# Patient Record
Sex: FEMALE | Race: WHITE | NOT HISPANIC OR LATINO | Employment: OTHER | URBAN - METROPOLITAN AREA
[De-identification: names, ages, dates, MRNs, and addresses within clinical notes are randomized per-mention and may not be internally consistent; named-entity substitution may affect disease eponyms.]

---

## 2017-03-01 ENCOUNTER — OFFICE VISIT (OUTPATIENT)
Dept: MEDICAL GROUP | Facility: MEDICAL CENTER | Age: 67
End: 2017-03-01
Payer: MEDICARE

## 2017-03-01 VITALS
TEMPERATURE: 99.1 F | HEART RATE: 78 BPM | OXYGEN SATURATION: 93 % | DIASTOLIC BLOOD PRESSURE: 66 MMHG | HEIGHT: 60 IN | BODY MASS INDEX: 44.88 KG/M2 | SYSTOLIC BLOOD PRESSURE: 128 MMHG | WEIGHT: 228.6 LBS

## 2017-03-01 DIAGNOSIS — E66.01 MORBID OBESITY WITH BMI OF 40.0-44.9, ADULT (HCC): ICD-10-CM

## 2017-03-01 DIAGNOSIS — E78.5 HYPERLIPIDEMIA, UNSPECIFIED HYPERLIPIDEMIA TYPE: ICD-10-CM

## 2017-03-01 DIAGNOSIS — Z23 NEED FOR VACCINATION: ICD-10-CM

## 2017-03-01 DIAGNOSIS — Z12.31 ENCOUNTER FOR SCREENING MAMMOGRAM FOR MALIGNANT NEOPLASM OF BREAST: ICD-10-CM

## 2017-03-01 DIAGNOSIS — E07.9 THYROID DISEASE: ICD-10-CM

## 2017-03-01 DIAGNOSIS — M17.2 BILATERAL POST-TRAUMATIC OSTEOARTHRITIS OF KNEE: ICD-10-CM

## 2017-03-01 DIAGNOSIS — Z86.79 H/O: HYPERTENSION: ICD-10-CM

## 2017-03-01 DIAGNOSIS — Z86.19 H/O HERPES GENITALIS: ICD-10-CM

## 2017-03-01 DIAGNOSIS — Z12.11 COLON CANCER SCREENING: ICD-10-CM

## 2017-03-01 DIAGNOSIS — H35.30 MACULAR DEGENERATION, LEFT EYE: ICD-10-CM

## 2017-03-01 PROCEDURE — 99204 OFFICE O/P NEW MOD 45 MIN: CPT | Performed by: FAMILY MEDICINE

## 2017-03-01 PROCEDURE — G8482 FLU IMMUNIZE ORDER/ADMIN: HCPCS | Performed by: FAMILY MEDICINE

## 2017-03-01 PROCEDURE — 3017F COLORECTAL CA SCREEN DOC REV: CPT | Mod: 1P | Performed by: FAMILY MEDICINE

## 2017-03-01 PROCEDURE — 4040F PNEUMOC VAC/ADMIN/RCVD: CPT | Mod: 8P | Performed by: FAMILY MEDICINE

## 2017-03-01 PROCEDURE — 3014F SCREEN MAMMO DOC REV: CPT | Performed by: FAMILY MEDICINE

## 2017-03-01 PROCEDURE — 1036F TOBACCO NON-USER: CPT | Performed by: FAMILY MEDICINE

## 2017-03-01 PROCEDURE — 1101F PT FALLS ASSESS-DOCD LE1/YR: CPT | Performed by: FAMILY MEDICINE

## 2017-03-01 PROCEDURE — G8419 CALC BMI OUT NRM PARAM NOF/U: HCPCS | Performed by: FAMILY MEDICINE

## 2017-03-01 PROCEDURE — G8432 DEP SCR NOT DOC, RNG: HCPCS | Performed by: FAMILY MEDICINE

## 2017-03-01 ASSESSMENT — PATIENT HEALTH QUESTIONNAIRE - PHQ9: CLINICAL INTERPRETATION OF PHQ2 SCORE: 0
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
## 2017-03-01 NOTE — MR AVS SNAPSHOT
Alyssa Sims   3/1/2017 8:40 AM   Office Visit   MRN: [id number]    Department:  [place] [address]   Dept Phone:  [phone]    Description:  Female : 1950   Provider:  Dylan Monroy M.D.           Reason for Visit     Establish Care request mammogram       Allergies as of 3/1/2017     Allergen Noted Reactions    Sulfa Drugs 2016         You were diagnosed with     Thyroid disease   [135996]       Morbid obesity with BMI of 40.0-44.9, adult (CMS-[facility])   [135255]       Hyperlipidemia, unspecified hyperlipidemia type   [4964761]       Bilateral post-traumatic osteoarthritis of knee   [7278096]       Macular degeneration, left eye   [795758]       H/O: hypertension   [938915]       H/O herpes genitalis   [137026]       Encounter for screening mammogram for malignant neoplasm of breast   [309299]       Colon cancer screening   [696000]       Need for vaccination   [671741]         Vital Signs     Blood Pressure Pulse Temperature Height Weight Body Mass Index    128/66 mmHg 78 37.3 °C (99.1 °F) 1.524 m (5') 103.692 kg (228 lb 9.6 oz) 44.65 kg/m2    Oxygen Saturation Smoking Status                93% Never Smoker           Basic Information     Date Of Birth Sex Race Ethnicity Preferred Language    1950 Female White Non- English      Problem List              ICD-10-CM Priority Class Noted - Resolved    Thyroid disease E07.9   3/1/2017 - Present    Morbid obesity with BMI of 40.0-44.9, adult ([facility]) E66.01, Z68.41   3/1/2017 - Present    Hyperlipidemia E78.5   3/1/2017 - Present    H/O: hypertension Z86.79   3/1/2017 - Present    Macular degeneration, left eye H35.30   3/1/2017 - Present    H/O herpes genitalis Z86.19   3/1/2017 - Present    Bilateral post-traumatic osteoarthritis of knee M17.2   3/1/2017 - Present      Health Maintenance        Date Due Completion Dates    IMM DTaP/Tdap/Td Vaccine (1 - Tdap) 1968 ---    PAP SMEAR 1970 ---    MAMMOGRAM 1989 ---    COLONOSCOPY 12/20/1999 ---    IMM ZOSTER VACCINE 12/20/2009 ---    BONE DENSITY 12/20/2014 ---    IMM PNEUMOCOCCAL 65+ (ADULT) LOW/MEDIUM RISK SERIES (1 of 2 - PCV13) 12/20/2014 ---            Current Immunizations     Influenza TIV (IM) 10/15/2016, 10/1/2015      Below and/or attached are the medications your provider expects you to take. Review all of your home medications and newly ordered medications with your provider and/or pharmacist. Follow medication instructions as directed by your provider and/or pharmacist. Please keep your medication list with you and share with your provider. Update the information when medications are discontinued, doses are changed, or new medications (including over-the-counter products) are added; and carry medication information at all times in the event of emergency situations     Allergies:  SULFA DRUGS - (reactions not documented)               Medications  Valid as of: March 01, 2017 -  9:13 AM    Generic Name Brand Name Tablet Size Instructions for use    Albuterol Sulfate (Aero Soln) albuterol 108 (90 BASE) MCG/ACT Inhale 2 Puffs by mouth every 6 hours as needed for Shortness of Breath.        Pneumococcal 13-Rosalina Conj Vacc (Suspension) PREVNAR 13  0.5 mL by Intramuscular route Once PRN for up to 1 dose.        Tetanus-Diphth-Acell Pertussis (Suspension) ADACEL 5-2-15.5 LF-MCG/0.5 0.5 mL by Intramuscular route Once PRN for up to 1 dose.        .                 Medicines prescribed today were sent to:     [facility] [address] [place] [address] - [address] [place] [address] AT [facility] & [address] [address]    Phone: [phone] Fax: [phone]    Open 24 Hours?: No      Medication refill instructions:       If your prescription bottle indicates you have medication refills left, it is not necessary to call your provider’s office. Please contact your pharmacy and they will refill your medication.    If your prescription bottle indicates you do not  have any refills left, you may request refills at any time through one of the following ways: The online [employer] system (except Urgent Care), by calling your provider’s office, or by asking your pharmacy to contact your provider’s office with a refill request. Medication refills are processed only during regular business hours and may not be available until the next business day. Your provider may request additional information or to have a follow-up visit with you prior to refilling your medication.   *Please Note: Medication refills are assigned a new Rx number when refilled electronically. Your pharmacy may indicate that no refills were authorized even though a new prescription for the same medication is available at the pharmacy. Please request the medicine by name with the pharmacy before contacting your provider for a refill.        Your To Do List     Future Labs/Procedures Complete By Expires    COMP METABOLIC PANEL  As directed 3/1/2018    LIPID PROFILE  As directed 3/1/2018    Comments:    Fasting at least 8hours    OCCULT BLOOD FECES IMMUNOASSAY  As directed 3/1/2018    TSH WITH REFLEX TO FT4  As directed 3/1/2018      Referral     A referral request has been sent to our patient care coordination department. Please allow 3-5 business days for us to process this request and contact you either by phone or mail. If you do not hear from us by the 5th business day, please call us at [phone].           [employer] Access Code: Activation code not generated  Current [employer] Status: Active

## 2017-03-01 NOTE — ASSESSMENT & PLAN NOTE
"Patient states she is seeing an ophthalmologist for macular degeneration of left eye. 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  "

## 2017-03-01 NOTE — ASSESSMENT & PLAN NOTE
"Patient states that she has thyroid disease, however is unclear if she has low thyroid or hyperthyroid. Given the patient was given thyroid supplementation past, she likely had hypothyroidism. However, she self discontinued the medication for this condition. Thereafter, she started taking a supplement \"thyroid health, \"which she takes on an every other day basis.    ROS is NEGATIVE for: cold or heat intolerance, anxiety/depression, chest pain/pressure, hair thickening/coarsening/falling out/thinning, skin changes, diarrhea/constipation.  "

## 2017-03-01 NOTE — ASSESSMENT & PLAN NOTE
Patient has been told she has hyperlipidemia, which was well-controlled on the past using a statin medication, however patient discontinue this medication. At present, patient will like to pursue vessel changes prior to restarting statin.    At some point her past, she also had a history of hypertension, for which she is currently not taking medication although does have controlled blood pressure. 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 Patient states she received an EKG as well as echocardiogram after diagnosis.    ROS is NEGATIVE for dizziness, generalized weakness/fatigue, vision/hearing changes, jaw pain/paresthesias, BUE pain/paresthesias/numbness/weakness, chest pain/pressure, palpitations, dyspnea, RUQ abdominal pain, oliguria/anuria, BLE edema.
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
## 2017-03-01 NOTE — ASSESSMENT & PLAN NOTE
Patient is a diagnosis of morbid obesity. Patient has been fairly consistent with her weight over the last few years. Patient states that she is interested in making dietary changes nor to lose weight.    ROS is NEGATIVE for blurred vision, polydipsia, polyuria, diaphoresis, palpitations, fatigue, irritability, flank pain, BLE paresthesias.    ROS is NEGATIVE for: cold or heat intolerance, anxiety/depression, chest pain/pressure, hair thickening/coarsening/falling out/thinning, skin changes, diarrhea/constipation.

## 2017-03-01 NOTE — Clinical Note
[facility]  Dylan Monroy M.D.  [address]  [place] [address]  Fax: [phone]   Authorization for Release/Disclosure of   Protected Health Information   Name: ALYSSA PICHARDO : 1950 SSN: [id number]   Address: [address] [place] [address] [address]  [place] [address] Phone:    [phone] (home)    I authorize the entity listed below to release/disclose the PHI below to:   [facility]/Dylan Monroy M.D. and Dylan Monroy M.D.   Provider or Entity Name:     Address   City, State, Zip   Phone:      Fax:     Reason for request: continuity of care   Information to be released:    [  ] LAST COLONOSCOPY,  including any PATH REPORT and follow-up  [  ] LAST FIT/COLOGUARD RESULT [  ] LAST DEXA  [  ] LAST MAMMOGRAM  [  ] LAST PAP  [  ] LAST LABS [  ] RETINA EXAM REPORT  [  ] IMMUNIZATION RECORDS  [  ] Release all info      [  ] Check here and initial the line next to each item to release ALL health information INCLUDING  _____ Care and treatment for drug and / or alcohol abuse  _____ HIV testing, infection status, or AIDS  _____ Genetic Testing    DATES OF SERVICE OR TIME PERIOD TO BE DISCLOSED: _____________  I understand and acknowledge that:  * This Authorization may be revoked at any time by you in writing, except if your health information has already been used or disclosed.  * Your health information that will be used or disclosed as a result of you signing this authorization could be re-disclosed by the recipient. If this occurs, your re-disclosed health information may no longer be protected by State or Federal laws.  * You may refuse to sign this Authorization. Your refusal will not affect your ability to obtain treatment.  * This Authorization becomes effective upon signing and will  on (date) __________.      If no date is indicated, this Authorization will  one (1) year from the signature date.    Name: Alyssa Pichardo    Signature:   Date:     3/1/2017       PLEASE  FAX REQUESTED RECORDS BACK TO: [phone]
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
## 2017-03-14 ENCOUNTER — HOSPITAL ENCOUNTER (OUTPATIENT)
Dept: LAB | Facility: MEDICAL CENTER | Age: 67
End: 2017-03-14
Attending: FAMILY MEDICINE
Payer: MEDICARE

## 2017-03-14 ENCOUNTER — HOSPITAL ENCOUNTER (OUTPATIENT)
Facility: MEDICAL CENTER | Age: 67
End: 2017-03-14
Attending: FAMILY MEDICINE
Payer: MEDICARE

## 2017-03-14 DIAGNOSIS — E66.01 MORBID OBESITY WITH BMI OF 40.0-44.9, ADULT (HCC): ICD-10-CM

## 2017-03-14 DIAGNOSIS — Z86.79 H/O: HYPERTENSION: ICD-10-CM

## 2017-03-14 DIAGNOSIS — E07.9 THYROID DISEASE: ICD-10-CM

## 2017-03-14 DIAGNOSIS — E78.5 HYPERLIPIDEMIA, UNSPECIFIED HYPERLIPIDEMIA TYPE: ICD-10-CM

## 2017-03-14 LAB
ALBUMIN SERPL BCP-MCNC: 3.9 G/DL (ref 3.2–4.9)
ALBUMIN/GLOB SERPL: 1.1 G/DL
ALP SERPL-CCNC: 75 U/L (ref 30–99)
ALT SERPL-CCNC: 41 U/L (ref 2–50)
ANION GAP SERPL CALC-SCNC: 8 MMOL/L (ref 0–11.9)
AST SERPL-CCNC: 31 U/L (ref 12–45)
BILIRUB SERPL-MCNC: 1.1 MG/DL (ref 0.1–1.5)
BUN SERPL-MCNC: 21 MG/DL (ref 8–22)
CALCIUM SERPL-MCNC: 9.6 MG/DL (ref 8.4–10.2)
CHLORIDE SERPL-SCNC: 106 MMOL/L (ref 96–112)
CHOLEST SERPL-MCNC: 280 MG/DL (ref 100–199)
CO2 SERPL-SCNC: 22 MMOL/L (ref 20–33)
CREAT SERPL-MCNC: 1.34 MG/DL (ref 0.5–1.4)
GFR SERPL CREATININE-BSD FRML MDRD: 39 ML/MIN/1.73 M 2
GLOBULIN SER CALC-MCNC: 3.4 G/DL (ref 1.9–3.5)
GLUCOSE SERPL-MCNC: 133 MG/DL (ref 65–99)
HDLC SERPL-MCNC: 41 MG/DL
LDLC SERPL CALC-MCNC: 199 MG/DL
POTASSIUM SERPL-SCNC: 4.1 MMOL/L (ref 3.6–5.5)
PROT SERPL-MCNC: 7.3 G/DL (ref 6–8.2)
SODIUM SERPL-SCNC: 136 MMOL/L (ref 135–145)
TRIGL SERPL-MCNC: 201 MG/DL (ref 0–149)
TSH SERPL DL<=0.005 MIU/L-ACNC: 4.97 UIU/ML (ref 0.35–5.5)

## 2017-03-14 PROCEDURE — 36415 COLL VENOUS BLD VENIPUNCTURE: CPT

## 2017-03-14 PROCEDURE — 84443 ASSAY THYROID STIM HORMONE: CPT

## 2017-03-14 PROCEDURE — 80061 LIPID PANEL: CPT

## 2017-03-14 PROCEDURE — 80053 COMPREHEN METABOLIC PANEL: CPT

## 2017-03-15 PROBLEM — N18.30 CHRONIC KIDNEY DISEASE, STAGE III (MODERATE): Status: ACTIVE | Noted: 2017-03-15

## 2017-03-17 DIAGNOSIS — Z12.11 COLON CANCER SCREENING: ICD-10-CM

## 2017-03-17 LAB — HEMOCCULT STL QL IA: NEGATIVE

## 2017-03-27 ENCOUNTER — NON-PROVIDER VISIT (OUTPATIENT)
Dept: MEDICAL GROUP | Facility: MEDICAL CENTER | Age: 67
End: 2017-03-27
Payer: MEDICARE

## 2017-03-27 VITALS — WEIGHT: 225.31 LBS | HEIGHT: 60 IN | BODY MASS INDEX: 44.23 KG/M2

## 2017-03-27 PROCEDURE — G0447 BEHAVIOR COUNSEL OBESITY 15M: HCPCS | Performed by: PHYSICIAN ASSISTANT

## 2017-03-27 NOTE — MR AVS SNAPSHOT
Alyssa Sims   3/27/2017 1:20 PM   Non-Provider Visit   MRN: [id number]    Department:  [place] [address]   Dept Phone:  [phone]    Description:  Female : 1950   Provider:  Sigrid Tran RD           Reason for Visit     Obesity           Allergies as of 3/27/2017     Allergen Noted Reactions    Sulfa Drugs 2016         You were diagnosed with     Body mass index 40.0-44.9, adult (CMS-HCC)   [V85.41.ICD-9-CM]         Vital Signs     Height Weight Body Mass Index Smoking Status          1.524 m (5') 102.2 kg (225 lb 5 oz) 44.00 kg/m2 Never Smoker         Basic Information     Date Of Birth Sex Race Ethnicity Preferred Language    1950 Female White Non- English      Your appointments     Mar 28, 2017  3:00 PM   MA SCRN10 with [facility] MG 3   [facility] ([address])    [address]  [place] [address]   [phone]           No deodorant, powder, perfume or lotion under the arm or breast area.            2017 11:20 AM   Established Patient with Dylan Monroy M.D.   [facility] ([place])    [address]  [place] [address] [phone]           You will be receiving a confirmation call a few days before your appointment from our automated call confirmation system.            2017  1:00 PM   Non Provider 1 with Sigrid Tran RD   [facility] ([place])    [address]  [address]  [place] [address] [phone]           You will be receiving a confirmation call a few days before your appointment from our automated call confirmation system.            Apr 10, 2017  1:00 PM   Non Provider 1 with Sigrid Tran RD   [facility] ([place])    [address]  [address]  [place] [address] [phone]           You will be receiving a confirmation call a few days before your appointment from  our automated call confirmation system.              Problem List              ICD-10-CM Priority Class Noted - Resolved    Thyroid disease E07.9   3/1/2017 - Present    Morbid obesity with BMI of 40.0-44.9, adult (HCC) E66.01, Z68.41   3/1/2017 - Present    Hyperlipidemia E78.5   3/1/2017 - Present    H/O: hypertension Z86.79   3/1/2017 - Present    Macular degeneration, left eye H35.30   3/1/2017 - Present    H/O herpes genitalis Z86.19   3/1/2017 - Present    Bilateral post-traumatic osteoarthritis of knee M17.2   3/1/2017 - Present    Chronic kidney disease, stage III (moderate) N18.3   3/15/2017 - Present      Health Maintenance        Date Due Completion Dates    IMM DTaP/Tdap/Td Vaccine (1 - Tdap) 12/20/1968 ---    PAP SMEAR 12/20/1970 ---    MAMMOGRAM 12/20/1989 ---    COLONOSCOPY 12/20/1999 ---    IMM ZOSTER VACCINE 12/20/2009 ---    BONE DENSITY 12/20/2014 ---    IMM PNEUMOCOCCAL 65+ (ADULT) LOW/MEDIUM RISK SERIES (1 of 2 - PCV13) 12/20/2014 ---            Current Immunizations     Influenza TIV (IM) 10/15/2016, 10/1/2015      Below and/or attached are the medications your provider expects you to take. Review all of your home medications and newly ordered medications with your provider and/or pharmacist. Follow medication instructions as directed by your provider and/or pharmacist. Please keep your medication list with you and share with your provider. Update the information when medications are discontinued, doses are changed, or new medications (including over-the-counter products) are added; and carry medication information at all times in the event of emergency situations     Allergies:  SULFA DRUGS - (reactions not documented)               Medications  Valid as of: March 27, 2017 -  2:19 PM    Generic Name Brand Name Tablet Size Instructions for use    Albuterol Sulfate (Aero Soln) albuterol 108 (90 BASE) MCG/ACT Inhale 2 Puffs by mouth every 6 hours as needed for Shortness of Breath.         Pneumococcal 13-Rosalina Conj Vacc (Suspension) PREVNAR 13  0.5 mL by Intramuscular route Once PRN for up to 1 dose.        Tetanus-Diphth-Acell Pertussis (Suspension) ADACEL 5-2-15.5 LF-MCG/0.5 0.5 mL by Intramuscular route Once PRN for up to 1 dose.        .                 Medicines prescribed today were sent to:     [facility] [address] [place] [address] - [facility] [facility] AT [facility] & [facility]    [address] [facility] [place] [address]    Phone: [phone] Fax: [phone]    Open 24 Hours?: No      Medication refill instructions:       If your prescription bottle indicates you have medication refills left, it is not necessary to call your provider’s office. Please contact your pharmacy and they will refill your medication.    If your prescription bottle indicates you do not have any refills left, you may request refills at any time through one of the following ways: The online [employer] system (except Urgent Care), by calling your provider’s office, or by asking your pharmacy to contact your provider’s office with a refill request. Medication refills are processed only during regular business hours and may not be available until the next business day. Your provider may request additional information or to have a follow-up visit with you prior to refilling your medication.   *Please Note: Medication refills are assigned a new Rx number when refilled electronically. Your pharmacy may indicate that no refills were authorized even though a new prescription for the same medication is available at the pharmacy. Please request the medicine by name with the pharmacy before contacting your provider for a refill.           [employer] Access Code: Activation code not generated  Current [employer] Status: Active

## 2017-03-27 NOTE — PROGRESS NOTES
IBT INITIAL ASSESMENT    Author: Sigrid Tran Date & Time created: 3/27/2017  1:52 PM   Visit #: 1  Referring Provider: No ref. provider found  Patient Age: 66 y.o.  Time in/Out:  1:20-1:54pm     ASSESS:  Filed Vitals:    03/27/17 1351   Height: 1.524 m (5')   Weight: 102.2 kg (225 lb 5 oz)      Filed Vitals:    03/27/17 1351   Height: 1.524 m (5')   Weight: 102.2 kg (225 lb 5 oz)    Body mass index is 44 kg/(m^2).   Goal Weight:   NA     The patient states  Health, heart health and cholesterol as motivators for weight loss and has tried many diets include some very low calorie diets such as 800kcal or 500kcal diets for weight loss in the past with out lasting success.    Comorbidities include  Thyroid disease, HLD, HTN, CKD stage III, s/p hysterectomy      Current Dietary/Exercise Habits  1.  How many servings of fruits and vegetables do you eat in a typical day?   1 fruit or less per day; has been eating more veggies   2. How many servings of whole grains do you eat in a typical day?   Few   3. How many times in a typical week do you eat foods high in fat or eat at a fast food restaurant?   1 per month or less due to finances   4. How many times in a typical week do you eat red meat, pork, or processed meat?   3 times per month . Some deli meats -- turkey or chx   5. How many days a week do you participate in moderately intense physical activity for 30 minutes?   None. Pt has hx of post viral syndrome and cannot tolerate much physical exertion. She is also moving soon so is more active lately doing that.   6. How many days a week do you participate in vigorously intense physical activity for 20 minutes?   None   7. How many days a week do you do strength training exercise?  None   8. How many meals and snacks do you eat in a typical day? 2 meals. No snacks   9. Nutrition History/other: Pt used to be underweight as a child and young adult. After having kids gained weight and then got sick and put on a lot of weight.  Has a hx of viral illness that has reduced her activity level and was encouraged to eat a lot by her doctors to gain weight and while taking certain medications. She now has stopped worrying about weight for many years but recently has wanted to try to lose weight again. Looked at the vegan book Dr. Monroy recommended. But does not think a 100% vegan diet would work for her long term.     Estimated Stage of Change   Contemplation as evidenced by not yet able to plan goals for dietary changes.    ADVISE:    Physical Activity:   Encouraged pt to reduce her sedentary time by getting up and walking around every 90mins. Also encouraged her to walk around her neighborhood to add to daily activity. However only light exercise as tolerated. 2 days a week. Especially with the move . Pt knows her own limits and should not over exert herself.      Dietary Guidelines:   Encouraged pt to keep a food journal in order to assess areas of her diet and she feels she could improve upon. Explained goals of this program. Helped pt to set initial goals.  Recommended she try eating more frequent that just 2 times a day.     The patient has been advised of how weight management and physical activity impacts their health and will help to reduce complications and health risk factors.    AGREE:  Goals:   1. Keep a 3-5 day food diary of all foods/drinks consumed, the amount you consumed (approximately), and the time it was when consumed.   2. Eat 3 meals a day   3. Walk neighborhood 2 days this week     ASSIST:  Pt has a complicated pmhx. She has been underweight with rapid weight gain and then rapid weight loss on very low calorie diets. I anticipate her metabolic rate has slowed with age, inactivity, thyroid disease and extreme dieting. Will encouraged her to eat small frequent meals, increase activity as tolerated to start. A plant based diet would be ideal for her given her cardiac hx and not taking statins despite high cholesterol. However  will work with her on how to apply a plant based diet in to her lifestyle. Pt cannot cook on stove tops due to poor vision. She can prepare meals but has to be cautious. This will also be taken in to consideration. She will bring in the name of the book Cher Monroy recommended as well so I can help her with understanding that.     ARRANGE:     Return for follow-up in  1 week    The patient was assisted in making follow-up appointment per orders.

## 2017-03-28 ENCOUNTER — HOSPITAL ENCOUNTER (OUTPATIENT)
Dept: RADIOLOGY | Facility: MEDICAL CENTER | Age: 67
End: 2017-03-28
Attending: FAMILY MEDICINE
Payer: MEDICARE

## 2017-03-28 PROCEDURE — 77063 BREAST TOMOSYNTHESIS BI: CPT

## 2017-04-03 ENCOUNTER — OFFICE VISIT (OUTPATIENT)
Dept: MEDICAL GROUP | Facility: MEDICAL CENTER | Age: 67
End: 2017-04-03
Payer: MEDICARE

## 2017-04-03 ENCOUNTER — NON-PROVIDER VISIT (OUTPATIENT)
Dept: MEDICAL GROUP | Facility: MEDICAL CENTER | Age: 67
End: 2017-04-03
Payer: MEDICARE

## 2017-04-03 VITALS
TEMPERATURE: 98.1 F | DIASTOLIC BLOOD PRESSURE: 82 MMHG | HEIGHT: 60 IN | BODY MASS INDEX: 44.25 KG/M2 | OXYGEN SATURATION: 95 % | HEART RATE: 87 BPM | SYSTOLIC BLOOD PRESSURE: 140 MMHG | WEIGHT: 225.4 LBS

## 2017-04-03 DIAGNOSIS — E66.01 MORBID OBESITY WITH BMI OF 40.0-44.9, ADULT (HCC): ICD-10-CM

## 2017-04-03 DIAGNOSIS — E78.5 HYPERLIPIDEMIA, UNSPECIFIED HYPERLIPIDEMIA TYPE: ICD-10-CM

## 2017-04-03 DIAGNOSIS — Z90.710 S/P HYSTERECTOMY WITH OOPHORECTOMY: ICD-10-CM

## 2017-04-03 DIAGNOSIS — Z90.721 S/P HYSTERECTOMY WITH OOPHORECTOMY: ICD-10-CM

## 2017-04-03 DIAGNOSIS — R92.8 ABNORMAL MAMMOGRAM OF LEFT BREAST: ICD-10-CM

## 2017-04-03 DIAGNOSIS — Z23 NEED FOR VACCINATION: ICD-10-CM

## 2017-04-03 DIAGNOSIS — R73.01 ELEVATED FASTING BLOOD SUGAR: ICD-10-CM

## 2017-04-03 PROCEDURE — G0447 BEHAVIOR COUNSEL OBESITY 15M: HCPCS | Performed by: FAMILY MEDICINE

## 2017-04-03 PROCEDURE — 99215 OFFICE O/P EST HI 40 MIN: CPT | Mod: 25 | Performed by: FAMILY MEDICINE

## 2017-04-03 PROCEDURE — 90715 TDAP VACCINE 7 YRS/> IM: CPT | Performed by: FAMILY MEDICINE

## 2017-04-03 PROCEDURE — 90471 IMMUNIZATION ADMIN: CPT | Performed by: FAMILY MEDICINE

## 2017-04-03 NOTE — MR AVS SNAPSHOT
Alyssa Sims   4/3/2017 1:00 PM   Non-Provider Visit   MRN: [id number]    Department:  [place] [address]   Dept Phone:  [phone]    Description:  Female : 1950   Provider:  Sigrid Tran RD           Reason for Visit     Obesity           Allergies as of 4/3/2017     Allergen Noted Reactions    Sulfa Drugs 2016         Vital Signs     Smoking Status                   Never Smoker            Basic Information     Date Of Birth Sex Race Ethnicity Preferred Language    1950 Female White Non- English      Your appointments     Apr 10, 2017  1:40 PM   Non Provider 1 with Sigrid Tran RD   [facility] ([place])    [address]  [address]  [place] [address] [phone]           You will be receiving a confirmation call a few days before your appointment from our automated call confirmation system.            2017 11:00 AM   Established Patient with Dylan Monroy M.D.   [facility] ([place])    [address]  [address]  [place] [id number] [phone]           You will be receiving a confirmation call a few days before your appointment from our automated call confirmation system.              Problem List              ICD-10-CM Priority Class Noted - Resolved    Thyroid disease E07.9   3/1/2017 - Present    Morbid obesity with BMI of 40.0-44.9, adult (HCC) E66.01, Z68.41   3/1/2017 - Present    Hyperlipidemia E78.5   3/1/2017 - Present    H/O: hypertension Z86.79   3/1/2017 - Present    Macular degeneration, left eye H35.30   3/1/2017 - Present    H/O herpes genitalis Z86.19   3/1/2017 - Present    Bilateral post-traumatic osteoarthritis of knee M17.2   3/1/2017 - Present    Chronic kidney disease, stage III (moderate) N18.3   3/15/2017 - Present    Elevated fasting blood sugar R73.01   4/3/2017 - Present    S/P hysterectomy with oophorectomy Z90.710, Z90.721   4/3/2017 - Present       Health Maintenance        Date Due Completion Dates    IMM DTaP/Tdap/Td Vaccine (1 - Tdap) 12/20/1968 ---    MAMMOGRAM 12/20/1989 ---    IMM ZOSTER VACCINE 12/20/2009 ---    BONE DENSITY 12/20/2014 ---    IMM PNEUMOCOCCAL 65+ (ADULT) LOW/MEDIUM RISK SERIES (1 of 2 - PCV13) 12/20/2014 ---    COLON CANCER SCREENING ANNUAL FIT 3/14/2018 3/14/2017            Current Immunizations     13-VALENT PCV PREVNAR  Incomplete    Influenza TIV (IM) 10/15/2016, 10/1/2015    Tdap Vaccine 4/3/2017      Below and/or attached are the medications your provider expects you to take. Review all of your home medications and newly ordered medications with your provider and/or pharmacist. Follow medication instructions as directed by your provider and/or pharmacist. Please keep your medication list with you and share with your provider. Update the information when medications are discontinued, doses are changed, or new medications (including over-the-counter products) are added; and carry medication information at all times in the event of emergency situations     Allergies:  SULFA DRUGS - (reactions not documented)               Medications  Valid as of: April 03, 2017 -  2:24 PM    Generic Name Brand Name Tablet Size Instructions for use    Albuterol Sulfate (Aero Soln) albuterol 108 (90 BASE) MCG/ACT Inhale 2 Puffs by mouth every 6 hours as needed for Shortness of Breath.        .                 Medicines prescribed today were sent to:     [facility] [address] [place] [address]    Phone: [phone] Fax: [phone]    Open 24 Hours?: No      Medication refill instructions:       If your prescription bottle indicates you have medication refills left, it is not necessary to call your provider’s office. Please contact your pharmacy and they will refill your medication.    If your prescription bottle indicates you do not have any refills left, you  may request refills at any time through one of the following ways: The online [employer] system (except Urgent Care), by calling your provider’s office, or by asking your pharmacy to contact your provider’s office with a refill request. Medication refills are processed only during regular business hours and may not be available until the next business day. Your provider may request additional information or to have a follow-up visit with you prior to refilling your medication.   *Please Note: Medication refills are assigned a new Rx number when refilled electronically. Your pharmacy may indicate that no refills were authorized even though a new prescription for the same medication is available at the pharmacy. Please request the medicine by name with the pharmacy before contacting your provider for a refill.           [employer] Access Code: Activation code not generated  Current [employer] Status: Active

## 2017-04-03 NOTE — Clinical Note
[facility]  Dylan Monroy M.D.  [address]  [place] [address]  Fax: [phone]   Authorization for Release/Disclosure of   Protected Health Information   Name: ALYSSA PICHARDO : 1950 SSN: [id number]   Address: [address] [place] [address] [address]  [place] [address] Phone:    [phone] (home)    I authorize the entity listed below to release/disclose the PHI below to:   [facility]/Dylan Monroy M.D. and Dylan Monroy M.D.   Provider or Entity Name:     Address   City, State, Zip   Phone:      Fax:     Reason for request: continuity of care   Information to be released:    [  ] LAST COLONOSCOPY,  including any PATH REPORT and follow-up  [  ] LAST FIT/COLOGUARD RESULT [  ] LAST DEXA  [  ] LAST MAMMOGRAM  [  ] LAST PAP  [  ] LAST LABS [  ] RETINA EXAM REPORT  [  ] IMMUNIZATION RECORDS  [  ] Release all info      [  ] Check here and initial the line next to each item to release ALL health information INCLUDING  _____ Care and treatment for drug and / or alcohol abuse  _____ HIV testing, infection status, or AIDS  _____ Genetic Testing    DATES OF SERVICE OR TIME PERIOD TO BE DISCLOSED: _____________  I understand and acknowledge that:  * This Authorization may be revoked at any time by you in writing, except if your health information has already been used or disclosed.  * Your health information that will be used or disclosed as a result of you signing this authorization could be re-disclosed by the recipient. If this occurs, your re-disclosed health information may no longer be protected by State or Federal laws.  * You may refuse to sign this Authorization. Your refusal will not affect your ability to obtain treatment.  * This Authorization becomes effective upon signing and will  on (date) __________.      If no date is indicated, this Authorization will  one (1) year from the signature date.    Name: Alyssa Pichardo    Signature:   Date:     4/3/2017            PLEASE FAX REQUESTED RECORDS BACK TO: [phone]

## 2017-04-03 NOTE — MR AVS SNAPSHOT
Alyssa Sims   4/3/2017 11:20 AM   Office Visit   MRN: [id number]    Department:  [place] [address]   Dept Phone:  [phone]    Description:  Female : 1950   Provider:  Dylan Monroy M.D.           Reason for Visit     Follow-Up general check-up      Allergies as of 4/3/2017     Allergen Noted Reactions    Sulfa Drugs 2016         You were diagnosed with     Elevated fasting blood sugar   [891326]       S/P hysterectomy with oophorectomy   [153859]       Need for vaccination   [372180]       Morbid obesity with BMI of 40.0-44.9, adult (CMS-[facility])   [750872]         Vital Signs     Blood Pressure Pulse Temperature Height Weight Body Mass Index    140/82 mmHg 87 36.7 °C (98.1 °F) 1.524 m (5') 102.241 kg (225 lb 6.4 oz) 44.02 kg/m2    Oxygen Saturation Breastfeeding? Smoking Status             95% No Never Smoker          Basic Information     Date Of Birth Sex Race Ethnicity Preferred Language    1950 Female White Non- English      Your appointments     2017  1:00 PM   Non Provider 1 with Sigrid Tran RD   [facility] ([place])    [address]  [place] [id number] [phone]           You will be receiving a confirmation call a few days before your appointment from our automated call confirmation system.            Apr 10, 2017  1:00 PM   Non Provider 1 with Sigrid Tran RD   [facility])    [address]  [address]  [place] [address] [phone]           You will be receiving a confirmation call a few days before your appointment from our automated call confirmation system.            2017 11:00 AM   Established Patient with Dylan Monroy M.D.   [facility] ([place])    [address]  [address]  [place] [address] [phone]           You will be receiving a confirmation call a few days before your  appointment from our automated call confirmation system.              Problem List              ICD-10-CM Priority Class Noted - Resolved    Thyroid disease E07.9   3/1/2017 - Present    Morbid obesity with BMI of 40.0-44.9, adult (HCC) E66.01, Z68.41   3/1/2017 - Present    Hyperlipidemia E78.5   3/1/2017 - Present    H/O: hypertension Z86.79   3/1/2017 - Present    Macular degeneration, left eye H35.30   3/1/2017 - Present    H/O herpes genitalis Z86.19   3/1/2017 - Present    Bilateral post-traumatic osteoarthritis of knee M17.2   3/1/2017 - Present    Chronic kidney disease, stage III (moderate) N18.3   3/15/2017 - Present    Elevated fasting blood sugar R73.01   4/3/2017 - Present    S/P hysterectomy with oophorectomy Z90.710, Z90.721   4/3/2017 - Present      Health Maintenance        Date Due Completion Dates    IMM DTaP/Tdap/Td Vaccine (1 - Tdap) 12/20/1968 ---    MAMMOGRAM 12/20/1989 ---    IMM ZOSTER VACCINE 12/20/2009 ---    BONE DENSITY 12/20/2014 ---    IMM PNEUMOCOCCAL 65+ (ADULT) LOW/MEDIUM RISK SERIES (1 of 2 - PCV13) 12/20/2014 ---    COLON CANCER SCREENING ANNUAL FIT 3/14/2018 3/14/2017            Current Immunizations     13-VALENT PCV PREVNAR  Incomplete    Influenza TIV (IM) 10/15/2016, 10/1/2015    Tdap Vaccine  Incomplete      Below and/or attached are the medications your provider expects you to take. Review all of your home medications and newly ordered medications with your provider and/or pharmacist. Follow medication instructions as directed by your provider and/or pharmacist. Please keep your medication list with you and share with your provider. Update the information when medications are discontinued, doses are changed, or new medications (including over-the-counter products) are added; and carry medication information at all times in the event of emergency situations     Allergies:  SULFA DRUGS - (reactions not documented)               Medications  Valid as of: April 03, 2017 - 12:10  PM    Generic Name Brand Name Tablet Size Instructions for use    Albuterol Sulfate (Aero Soln) albuterol 108 (90 BASE) MCG/ACT Inhale 2 Puffs by mouth every 6 hours as needed for Shortness of Breath.        .                 Medicines prescribed today were sent to:     [facility] [address] [place] - [address] AT [facility]    [address] [facility] [place] [address]    Phone: [phone] Fax: [phone]    Open 24 Hours?: No      Medication refill instructions:       If your prescription bottle indicates you have medication refills left, it is not necessary to call your provider’s office. Please contact your pharmacy and they will refill your medication.    If your prescription bottle indicates you do not have any refills left, you may request refills at any time through one of the following ways: The online [employer] system (except Urgent Care), by calling your provider’s office, or by asking your pharmacy to contact your provider’s office with a refill request. Medication refills are processed only during regular business hours and may not be available until the next business day. Your provider may request additional information or to have a follow-up visit with you prior to refilling your medication.   *Please Note: Medication refills are assigned a new Rx number when refilled electronically. Your pharmacy may indicate that no refills were authorized even though a new prescription for the same medication is available at the pharmacy. Please request the medicine by name with the pharmacy before contacting your provider for a refill.           [employer] Access Code: Activation code not generated  Current [employer] Status: Active

## 2017-04-03 NOTE — ASSESSMENT & PLAN NOTE
Patient and I discussed recent labs (see below) and that 10year ASCVD risk based on most recent lipid panel, current blood pressure (non-hypertensive without medication), diabetes status (non-diabetic), and smoking status (non-smoker) is: calculated risk 10.4%, risk with optimal risk factors 5.0% (HDL >60, TChol 170, non-hypertensive, non-smoker, non-diabetic).    Patient and I then discussed necessary dietary changes to make to address dyslipidemia.  Patient verbalized understanding.    ROS is NEGATIVE for dizziness, generalized weakness/fatigue, vision/hearing changes, jaw pain/paresthesias, BUE pain/paresthesias/numbness/weakness, chest pain/pressure, palpitations, dyspnea, RUQ abdominal pain, oliguria/anuria, BLE edema.    Diet history as scanned into media tab.  Patient is working with dietitian to increase proportion of plant-based diet, decrease processed food.    Lab Results   Component Value Date/Time    CHOLESTEROL,* 03/14/2017 09:45 AM    * 03/14/2017 09:45 AM    HDL 41 03/14/2017 09:45 AM    TRIGLYCERIDES 201* 03/14/2017 09:45 AM

## 2017-04-04 VITALS — HEIGHT: 60 IN | BODY MASS INDEX: 44.04 KG/M2 | WEIGHT: 224.3 LBS

## 2017-04-04 NOTE — ASSESSMENT & PLAN NOTE
Please see notes from same date of service 04/03/2017 re: HLD.  Patient is trying to make healthy lifestyle changes in order to improve her health outcomes and decrease weight.

## 2017-04-04 NOTE — ASSESSMENT & PLAN NOTE
"Patient states that she had hysterectomy 2/2 \"exploding cysts,\" with the surgery complicated by hemorrhage.  Patient states that she was told she no longer needed pap smears after hysterecomty.    ROS is NEGATIVE for generalized weakness/fatigue, generalized pallor, dizziness, lightheadedness, blurred vision, chest pain/pressure, palpitations, tachycardia, dyspnea, hematuria, vaginal bleeding, rigors, flank pain, dysuria, hematuria, pyuria, polyuria, increased frequency of urination.  "

## 2017-04-04 NOTE — ASSESSMENT & PLAN NOTE
Patient noted to have elevated fasting blood sugar.  In light of HLD, patient would like to know if she has prediabetes vs. Diabetes.    ROS is NEGATIVE for blurred vision, polydipsia, polyuria, diaphoresis, palpitations, fatigue, irritability, flank pain, BLE paresthesias.

## 2017-04-04 NOTE — PROGRESS NOTES
4/4/2017     Visit # 2      Referring Provider: Dylan Monroy M.D. Donna J Porter 66 y.o.           Time in/Out:   1:00-1:20pm     ASSESS:    Filed Vitals:    04/04/17 1052   Height: 1.524 m (5')   Weight: 101.742 kg (224 lb 4.8 oz)            Wt Readings from Last 2 Encounters:   04/04/17 101.742 kg (224 lb 4.8 oz)   04/03/17 102.241 kg (225 lb 6.4 oz)            Body mass index is 43.81 kg/(m^2).       Weight change since last visit:   -0.6lb      Starting weight:   224.8lb       Total weight change:  -0.6lb      Current Dietary/Exercise Habits:  Pt states she was unable to walk due to feeling poor, and being busy moving/packing for her move. She kept a food journal but states she did not get much out of it. Has not changed much about her eatin yet. She has many food preference and doesn't like things like oatmeal or avocado or nuts. She read the book recommended by Dr Monroy but does not feel she will be able to follow it given that many of the foods promoted by it she does not like. She had some stouffers meals and pasta in her house she ate this is trying to get rid of.     Estimated Stage of Change:  Preparation as evidenced by getting rid of old foods and planning to buy new healthier ones.      ADVISE:    Physical Activity:   Pt will hold off on starting to exercise until after her move. She was encouraged to get a recumbent bike as able.      Dietary Guidelines:  Reviewed nutrition basics - carbs, protein, and fats - what they do for the body and what dietary sources are most healthy.  Discussed the benefits of choosing whole grains, low fat dairy, lean proteins, healthy fats, and limiting saturated fat, refined sugar/concentrated sweets.  Encouraged a mostly plant based diet and emphasized eating more foods as grown; less processed foods. She should also limit her saturated fat and increase dietary fiber to help control cholesterol without medication as she desires.     The patient has been  advised of how weight management and physical activity impacts their health and will help to reduce complications and health risk factors.        AGREE:  Previous Goals:   1. Keep a 3-5 day food diary of all foods/drinks consumed, the amount you consumed (approximately), and the time it was when consumed.    2. Eat 3 meals a day    3. Walk neighborhood 2 days this week      New Goals:   1. Choose whole grains, low fat dairy, lean meats and increase vegetable consumption.    2. Try beans, quinoa, sweet potato, ezichiel bread or other whole foods complex carbohydrates   3. Continue food journal one more week     ASSIST:  Alyssa has been stressed about her move. She has not made any change to her diet yet, also because she has not been grocery shopping and doesn't plan to until after she moves. I will help pt learn about a heart healthy diet, and help encourage plant based eating that her doctor is promoting. She may not be able to follow it 100% but even if we can get her eating less processed foods and less saturated fat she should see some benefit. Exercise will also be essential for her weight loss and health goals. However she does have some barriers we will have to work through. Next visit we will go over portion control and review food journal again.       ARRANGE:     Return for follow-up in 1 week      The patient was assisted in making follow-up appointment per orders.

## 2017-04-04 NOTE — PROGRESS NOTES
Subjective:     Chief Complaint   Patient presents with   • Follow-Up     general check-up       History of Present Illness:  Alyssa Sims is a 66 y.o. female established patient who presents today to follow-up on recent labs as well as to discuss management for obesity, HLD:    Hyperlipidemia  Patient and I discussed recent labs (see below) and that 10year ASCVD risk based on most recent lipid panel, current blood pressure (non-hypertensive without medication), diabetes status (non-diabetic), and smoking status (non-smoker) is: calculated risk 10.4%, risk with optimal risk factors 5.0% (HDL >60, TChol 170, non-hypertensive, non-smoker, non-diabetic).    Patient and I then discussed necessary dietary changes to make to address dyslipidemia.  Patient verbalized understanding.    ROS is NEGATIVE for dizziness, generalized weakness/fatigue, vision/hearing changes, jaw pain/paresthesias, BUE pain/paresthesias/numbness/weakness, chest pain/pressure, palpitations, dyspnea, RUQ abdominal pain, oliguria/anuria, BLE edema.    Diet history as scanned into media tab.  Patient is working with dietitian to increase proportion of plant-based diet, decrease processed food.    Lab Results   Component Value Date/Time    CHOLESTEROL,* 03/14/2017 09:45 AM    * 03/14/2017 09:45 AM    HDL 41 03/14/2017 09:45 AM    TRIGLYCERIDES 201* 03/14/2017 09:45 AM         Elevated fasting blood sugar  Patient noted to have elevated fasting blood sugar.  In light of HLD, patient would like to know if she has prediabetes vs. Diabetes.    ROS is NEGATIVE for blurred vision, polydipsia, polyuria, diaphoresis, palpitations, fatigue, irritability, flank pain, BLE paresthesias.      Morbid obesity with BMI of 40.0-44.9, adult (HCC)  Please see notes from same date of service 04/03/2017 re: HLD.  Patient is trying to make healthy lifestyle changes in order to improve her health outcomes and decrease weight.    S/P hysterectomy with  "oophorectomy  Patient states that she had hysterectomy 2/2 \"exploding cysts,\" with the surgery complicated by hemorrhage.  Patient states that she was told she no longer needed pap smears after hysterecomty.    ROS is NEGATIVE for generalized weakness/fatigue, generalized pallor, dizziness, lightheadedness, blurred vision, chest pain/pressure, palpitations, tachycardia, dyspnea, hematuria, vaginal bleeding, rigors, flank pain, dysuria, hematuria, pyuria, polyuria, increased frequency of urination.      Patient Active Problem List    Diagnosis Date Noted   • Elevated fasting blood sugar 04/03/2017   • S/P hysterectomy with oophorectomy 04/03/2017   • Chronic kidney disease, stage III (moderate) 03/15/2017   • Thyroid disease 03/01/2017   • Morbid obesity with BMI of 40.0-44.9, adult (HCC) 03/01/2017   • Hyperlipidemia 03/01/2017   • H/O: hypertension 03/01/2017   • Macular degeneration, left eye 03/01/2017   • H/O herpes genitalis 03/01/2017   • Bilateral post-traumatic osteoarthritis of knee 03/01/2017       Additional History:   Allergies:    Sulfa drugs     Current Medications:     Current Outpatient Prescriptions   Medication Sig Dispense Refill   • albuterol 108 (90 BASE) MCG/ACT Aero Soln inhalation aerosol Inhale 2 Puffs by mouth every 6 hours as needed for Shortness of Breath. 1 Inhaler 3     No current facility-administered medications for this visit.        Social History:     Social History   Substance Use Topics   • Smoking status: Never Smoker    • Smokeless tobacco: Never Used   • Alcohol Use: Yes      Comment: very rare       ROS:     - ROS is NEGATIVE for dizziness, generalized weakness/fatigue, vision/hearing changes, jaw pain/paresthesias, BUE pain/paresthesias/numbness/weakness, chest pain/pressure, palpitations, dyspnea, RUQ abdominal pain, oliguria/anuria, BLE edema.    - NOTE: All other systems reviewed and are negative, except as in HPI.     Objective:   Physical Exam:    Vitals: Blood pressure " 140/82, pulse 87, temperature 36.7 °C (98.1 °F), height 1.524 m (5'), weight 102.241 kg (225 lb 6.4 oz), SpO2 95 %, not currently breastfeeding.   BMI: Body mass index is 44.02 kg/(m^2).   General/Constitutional: Vitals as above, Well nourished, well developed female in no acute distress   Head/Eyes: Head is grossly normal & atraumatic, bilateral conjunctivae clear and not injected, bilateral EOMI, bilateral PERRL   ENT: Bilateral external ears grossly normal in appearance, Hearing grossly intact, External nares normal in appearance and without discharge/bleeding   Respiratory: No respiratory distress, bilateral lungs are clear to ausculation in all lung fields (anterior/lateral/posterior), no wheezing/rhonchi/rales   Cardiovascular: Regular rate and rhythm without murmur/gallops/rubs, distal pulses are intact and equal bilaterally (radial, posterior tibial), no bilateral lower extremity edema   MSK: Gait grossly normal & not antalgic   Integumentary: No apparent rashes   Psych: Judgment grossly appropriate, no apparent depression/anxiety    Imaging/Labs:   CMP WNL    Lab Results   Component Value Date/Time    SODIUM 136 03/14/2017 09:45 AM    POTASSIUM 4.1 03/14/2017 09:45 AM    CHLORIDE 106 03/14/2017 09:45 AM    CO2 22 03/14/2017 09:45 AM    GLUCOSE 133* 03/14/2017 09:45 AM    BUN 21 03/14/2017 09:45 AM    CREATININE 1.34 03/14/2017 09:45 AM     Lab Results   Component Value Date/Time    ALKALINE PHOSPHATASE 75 03/14/2017 09:45 AM    AST(SGOT) 31 03/14/2017 09:45 AM    ALT(SGPT) 41 03/14/2017 09:45 AM    TOTAL BILIRUBIN 1.1 03/14/2017 09:45 AM      POC A1c 5.3%      Assessment and Plan:   1. Hyperlipidemia, unspecified hyperlipidemia type  Uncontrolled.  Patient and I discussed the importance of lifestyle changes, with particular emphasis on plant-based nutrition (for the purposes of weight loss, general health, HLD), as well as cardiovascular exercise, proper sleep, and stress management.  This discussion is  briefly summarized in the patient instruction section below.  Patient verbalized understanding.    2. Elevated fasting blood sugar  No prediabetes, no diabetes.     - POCT  A1C    3. Morbid obesity with BMI of 40.0-44.9, adult (HCC)  Uncontrolled.  See #1 as above.    4. S/P hysterectomy with oophorectomy  Documented for historical purposes.  Hysterectomy not performed 2/2 cancer diagnoses.    5. Need for vaccination   - TDAP VACCINE =>8YO IM   - PNEUMOCOCCAL CONJUGATE VACCINE 13-VALENT    NOTE: A total of 40minutes was spent in direct face-to-face time with the patient, of which over 50% of the time was spent in counseling and/or coordination of care, the contents of which are described in this note.    PLEASE NOTE: This dictation was created using voice recognition software. I have made every reasonable attempt to correct obvious errors, but I expect that there are errors of grammar and possibly content that I did not discover before finalizing the note.

## 2017-04-10 ENCOUNTER — NON-PROVIDER VISIT (OUTPATIENT)
Dept: MEDICAL GROUP | Facility: MEDICAL CENTER | Age: 67
End: 2017-04-10
Payer: MEDICARE

## 2017-04-10 PROCEDURE — G0447 BEHAVIOR COUNSEL OBESITY 15M: HCPCS | Performed by: FAMILY MEDICINE

## 2017-04-11 VITALS — HEIGHT: 60 IN | WEIGHT: 223.11 LBS | BODY MASS INDEX: 43.8 KG/M2

## 2017-05-18 ENCOUNTER — HOSPITAL ENCOUNTER (OUTPATIENT)
Dept: RADIOLOGY | Facility: MEDICAL CENTER | Age: 67
End: 2017-05-18
Attending: FAMILY MEDICINE
Payer: MEDICARE

## 2017-05-18 DIAGNOSIS — R92.8 ABNORMAL MAMMOGRAM OF LEFT BREAST: ICD-10-CM

## 2017-05-18 PROCEDURE — 76642 ULTRASOUND BREAST LIMITED: CPT | Mod: LT

## 2017-05-18 PROCEDURE — G0279 TOMOSYNTHESIS, MAMMO: HCPCS | Mod: LT

## 2017-10-26 ENCOUNTER — PATIENT MESSAGE (OUTPATIENT)
Dept: MEDICAL GROUP | Facility: MEDICAL CENTER | Age: 67
End: 2017-10-26

## 2017-10-26 DIAGNOSIS — R92.8 ABNORMAL MAMMOGRAM OF LEFT BREAST: ICD-10-CM

## 2017-10-26 NOTE — TELEPHONE ENCOUNTER
From: Alyssa Sims  To: Dylan Monroy M.D.  Sent: 10/26/2017 2:50 PM PDT  Subject: Procedure Question    [facility] in [facility]. They had a clinic set up.  ----- Message -----  From: Dylan Monroy M.D.  Sent: 10/26/2017 2:19 PM PDT  To: Alyssa Sims  Subject: RE: Procedure Question  Greetings Ms. Sims,    I have placed the new mammogram order.   Where did you get the flu shot?    Sincerely,  Dr. Monroy      ----- Message -----   From: Alyssa Sims   Sent: 10/26/2017 1:18 PM PDT   To: Dylan Monroy M.D.  Subject: Procedure Question    I received alerted from [facility] imaging. Since the abnormal mamogram in May they want me to schedule a follow up mamogram. The office said they need an order from you Dr. Monroy. Ps got flu shot October 4th. Just the regular one. Will get the pneumonia shot November 20.

## 2017-10-26 NOTE — TELEPHONE ENCOUNTER
Please update the flu shot for this patient.  She received it on 10/04/17 at [facility] in [facility]

## 2017-10-26 NOTE — TELEPHONE ENCOUNTER
From: Alyssa Sims  To: Dylan Monroy M.D.  Sent: 10/26/2017 1:18 PM PDT  Subject: Procedure Question    I received alerted from [facility] imaging. Since the abnormal mamogram in May they want me to schedule a follow up mamogram. The office said they need an order from you Dr. Monroy. Ps got flu shot October 4th. Just the regular one. Will get the pneumonia shot November 20.

## 2017-11-07 ENCOUNTER — PATIENT OUTREACH (OUTPATIENT)
Dept: HEALTH INFORMATION MANAGEMENT | Facility: OTHER | Age: 67
End: 2017-11-07

## 2017-11-07 NOTE — PROGRESS NOTES
Attempt #:1    WebIZ Checked & Epic Updated: Yes  · WebIZ Recommendations: PREVNAR (PCV13)  and ZOSTAVAX (Shingles)  · Is patient due for Tdap? NO  · Is patient due for Shingles? YES. Patient was not notified of copay/out of pocket cost.  [facility] Verified: no  Verify PCP: yes    Communication Preference Obtained: yes     Review Care Team: yes    Annual Wellness Visit Scheduling  1. Scheduling Status:Scheduled     Care Gap Scheduling (Attempt to Schedule EACH Overdue Care Gap!)     Health Maintenance Due   Topic Date Due   • Annual Wellness Visit  1950   • IMM ZOSTER VACCINE  12/20/2009   • BONE DENSITY  12/20/2014   • IMM PNEUMOCOCCAL 65+ (ADULT) LOW/MEDIUM RISK SERIES (1 of 2 - PCV13) 12/20/2014   • IMM INFLUENZA (1) 09/01/2017        Scheduled patient for Annual Wellness Visit and Immunizations: PREVNAR (PCV13)  and ZOSTAVAX (Shingles)       [employer] Activation: already active  [employer] [place]: no  Virtual Visits: no  Opt In to Text Messages: no

## 2017-12-19 ENCOUNTER — TELEPHONE (OUTPATIENT)
Dept: RADIOLOGY | Facility: MEDICAL CENTER | Age: 67
End: 2017-12-19

## 2017-12-20 ENCOUNTER — HOSPITAL ENCOUNTER (OUTPATIENT)
Dept: RADIOLOGY | Facility: MEDICAL CENTER | Age: 67
End: 2017-12-20
Attending: FAMILY MEDICINE
Payer: MEDICARE

## 2017-12-20 DIAGNOSIS — R92.8 ABNORMAL MAMMOGRAM OF LEFT BREAST: ICD-10-CM

## 2017-12-20 PROCEDURE — G0206 DX MAMMO INCL CAD UNI: HCPCS | Mod: LT

## 2017-12-21 ENCOUNTER — TELEPHONE (OUTPATIENT)
Dept: MEDICAL GROUP | Facility: MEDICAL CENTER | Age: 67
End: 2017-12-21

## 2017-12-21 NOTE — TELEPHONE ENCOUNTER
----- Message from Dylan Monroy M.D. sent at 12/20/2017  6:37 PM PST -----  Please see MyChart Comments for more details.